# Patient Record
Sex: MALE | Race: WHITE | Employment: FULL TIME | URBAN - NONMETROPOLITAN AREA
[De-identification: names, ages, dates, MRNs, and addresses within clinical notes are randomized per-mention and may not be internally consistent; named-entity substitution may affect disease eponyms.]

---

## 2024-06-28 ENCOUNTER — TELEPHONE (OUTPATIENT)
Dept: FAMILY MEDICINE CLINIC | Age: 54
End: 2024-06-28

## 2024-06-28 ENCOUNTER — OFFICE VISIT (OUTPATIENT)
Dept: FAMILY MEDICINE CLINIC | Age: 54
End: 2024-06-28
Payer: COMMERCIAL

## 2024-06-28 VITALS
OXYGEN SATURATION: 98 % | DIASTOLIC BLOOD PRESSURE: 80 MMHG | SYSTOLIC BLOOD PRESSURE: 120 MMHG | HEART RATE: 98 BPM | WEIGHT: 183 LBS | TEMPERATURE: 100.5 F

## 2024-06-28 DIAGNOSIS — T14.8XXA MUSCLE STRAIN: Primary | ICD-10-CM

## 2024-06-28 DIAGNOSIS — M54.6 ACUTE RIGHT-SIDED THORACIC BACK PAIN: ICD-10-CM

## 2024-06-28 DIAGNOSIS — R19.01 RIGHT UPPER QUADRANT ABDOMINAL MASS: ICD-10-CM

## 2024-06-28 PROCEDURE — 4004F PT TOBACCO SCREEN RCVD TLK: CPT | Performed by: INTERNAL MEDICINE

## 2024-06-28 PROCEDURE — G8421 BMI NOT CALCULATED: HCPCS | Performed by: INTERNAL MEDICINE

## 2024-06-28 PROCEDURE — 99214 OFFICE O/P EST MOD 30 MIN: CPT | Performed by: INTERNAL MEDICINE

## 2024-06-28 PROCEDURE — 3017F COLORECTAL CA SCREEN DOC REV: CPT | Performed by: INTERNAL MEDICINE

## 2024-06-28 PROCEDURE — G8428 CUR MEDS NOT DOCUMENT: HCPCS | Performed by: INTERNAL MEDICINE

## 2024-06-28 RX ORDER — NAPROXEN 500 MG/1
500 TABLET ORAL 2 TIMES DAILY PRN
Qty: 20 TABLET | Refills: 0 | Status: SHIPPED
Start: 2024-06-28 | End: 2024-06-28

## 2024-06-28 RX ORDER — TIZANIDINE 4 MG/1
4 TABLET ORAL 3 TIMES DAILY PRN
Qty: 30 TABLET | Refills: 0 | Status: SHIPPED | OUTPATIENT
Start: 2024-06-28

## 2024-06-28 RX ORDER — NAPROXEN 500 MG/1
500 TABLET ORAL 2 TIMES DAILY PRN
Qty: 14 TABLET | Refills: 0 | Status: SHIPPED | OUTPATIENT
Start: 2024-06-28 | End: 2024-07-05

## 2024-06-28 RX ORDER — LISINOPRIL AND HYDROCHLOROTHIAZIDE 25; 20 MG/1; MG/1
1 TABLET ORAL DAILY
COMMUNITY
Start: 2024-06-08

## 2024-06-28 ASSESSMENT — ENCOUNTER SYMPTOMS
CONSTIPATION: 0
NAUSEA: 0
DIARRHEA: 0
ABDOMINAL PAIN: 1
VOMITING: 0
SHORTNESS OF BREATH: 0
BACK PAIN: 1

## 2024-06-28 NOTE — TELEPHONE ENCOUNTER
I personally spoke with patient regarding his thoracic back film and upper abdominal ultrasound.  No acute findings noted.  He did have some arthritic changes to the back and neck but no obvious hernia or abnormality to the right upper quadrant abdominal wall area.  I asked him to do what we talked about and expect a call from surgery next week for evaluation.  Follow-up with PCP.

## 2024-06-28 NOTE — PROGRESS NOTES
[facility]     24  Loy Méndez : 1970 Sex: male  Age: 54 y.o.    Chief Complaint   Patient presents with    Back Pain     2 weeks.  Noticed a \"lump\" on right side yesterday       HPI  Patient presents to express care today complaining of mid back pain for the past 2 weeks.  States he does a lot of heavy lifting at his job and lifted a heavy bag over his head feeling something pop in his back on the right side.  Has been bothering him since.  States the pain radiating around toward the side and front but now this morning noticed a lump to his right upper abdomen that is painful.  Denies any nausea vomiting or diarrhea.  No other abdominal pain.  States he is moving his bowels.  States when he is up walking moving around pain is not as bad as when he is sitting and lying.  Is having trouble sleeping because of this.  States he saw his chiropractor last week and had his back crack but did not really help.  No x-rays been done.  Denies any fever or chills although did have low-grade temperature of 100.5 here in the office today.          Review of Systems   Constitutional: Negative.  Negative for chills, fatigue and fever.   HENT: Negative.     Respiratory:  Negative for shortness of breath.    Cardiovascular:  Negative for chest pain, palpitations and leg swelling.   Gastrointestinal:  Positive for abdominal pain. Negative for constipation, diarrhea, nausea and vomiting.        See above   Endocrine: Negative.    Genitourinary:  Negative for dysuria, flank pain, frequency and hematuria.   Musculoskeletal:  Positive for back pain.   Skin:  Negative for rash.   Neurological:  Negative for dizziness, weakness, numbness and headaches.               REST OF PERTINENT ROS GONE OVER AND WAS NEGATIVE.                 Current Outpatient Medications:     lisinopril-hydroCHLOROthiazide (PRINZIDE;ZESTORETIC) 20-25 MG per tablet, Take 1 tablet by mouth daily, Disp: , Rfl:     tiZANidine (ZANAFLEX) 4 MG

## 2024-07-02 ENCOUNTER — OFFICE VISIT (OUTPATIENT)
Dept: SURGERY | Age: 54
End: 2024-07-02
Payer: COMMERCIAL

## 2024-07-02 VITALS
HEIGHT: 72 IN | OXYGEN SATURATION: 97 % | SYSTOLIC BLOOD PRESSURE: 122 MMHG | WEIGHT: 184 LBS | HEART RATE: 97 BPM | DIASTOLIC BLOOD PRESSURE: 85 MMHG | TEMPERATURE: 98 F | BODY MASS INDEX: 24.92 KG/M2

## 2024-07-02 DIAGNOSIS — R19.00 ABDOMINAL WALL BULGE: Primary | ICD-10-CM

## 2024-07-02 PROCEDURE — 99203 OFFICE O/P NEW LOW 30 MIN: CPT | Performed by: SURGERY

## 2024-07-02 RX ORDER — ATORVASTATIN CALCIUM 40 MG/1
TABLET, FILM COATED ORAL
COMMUNITY
Start: 2017-10-04

## 2024-07-02 NOTE — PROGRESS NOTES
status:        No family history on file.    Review of Systems   All other systems reviewed and are negative.              Objective:  Vitals:    07/02/24 1622   BP: 122/85   Pulse: 97   Temp: 98 °F (36.7 °C)   SpO2: 97%   Weight: 83.5 kg (184 lb)   Height: 1.829 m (6')        Body mass index is 24.95 kg/m².      Physical Exam  Constitutional:       General: He is not in acute distress.     Appearance: He is not diaphoretic.   HENT:      Head: Normocephalic and atraumatic.   Eyes:      General:         Right eye: No discharge.         Left eye: No discharge.   Neck:      Trachea: No tracheal deviation.   Cardiovascular:      Rate and Rhythm: Normal rate.   Pulmonary:      Effort: Pulmonary effort is normal. No respiratory distress.   Abdominal:      General: There is no distension.      Palpations: Abdomen is soft.      Tenderness: There is no guarding or rebound.       Skin:     General: Skin is warm and dry.   Neurological:      Mental Status: He is alert and oriented to person, place, and time.         CBC: No results found for: \"WBC\", \"RBC\", \"HGB\", \"HCT\", \"MCV\", \"MCH\", \"MCHC\", \"RDW\", \"PLT\", \"MPV\"  CMP:  No results found for: \"NA\", \"K\", \"CL\", \"CO2\", \"BUN\", \"CREATININE\", \"GFRAA\", \"AGRATIO\", \"LABGLOM\", \"GLUCOSE\", \"GLU\", \"LABALBU\", \"CALCIUM\", \"BILITOT\", \"ALKPHOS\", \"AST\", \"ALT\"  PT/INR:  No results found for: \"PROTIME\", \"INR\"  HgBA1c:  No results found for: \"LABA1C\"  LIPASE:  No results found for: \"LIPASE\"       Henri Copeland MD    I have examined the patient and performed the key aspects of physical exam, and reviewed the record (including laboratory findings, results, and all pertinent radiology images, which are independently reviewed and interpreted unless otherwise explicitly stated).  The referring provider's notes were also reviewed.    Any procedures planned or discussed as above had risks, benefits, and reasonable alternatives thoroughly discussed with the patient or responsible party.    NOTE: This

## 2024-07-22 ENCOUNTER — TELEPHONE (OUTPATIENT)
Dept: SURGERY | Age: 54
End: 2024-07-22

## 2024-07-22 DIAGNOSIS — R19.00 ABDOMINAL WALL BULGE: Primary | ICD-10-CM

## 2024-07-22 NOTE — TELEPHONE ENCOUNTER
Pt notified about CT with radiology on 7/25/24 at 430pm.   Patient has been informed of:  - prep if any  - date, time and location     - parking instructions    Given central scheduling's phone number if they need to cancel and reschedule.  Patient aware of labs prior to CT.    Scheduled follow up if needed.

## 2024-07-23 DIAGNOSIS — R19.00 ABDOMINAL WALL BULGE: ICD-10-CM

## 2024-07-23 LAB
BUN BLDV-MCNC: 15 MG/DL (ref 6–20)
CREAT SERPL-MCNC: 0.9 MG/DL (ref 0.7–1.2)
GFR, ESTIMATED: >90 ML/MIN/1.73M2

## 2024-07-25 ENCOUNTER — HOSPITAL ENCOUNTER (OUTPATIENT)
Dept: CT IMAGING | Age: 54
Discharge: HOME | End: 2024-07-27
Attending: SURGERY
Payer: COMMERCIAL

## 2024-07-25 DIAGNOSIS — R19.00 ABDOMINAL WALL BULGE: ICD-10-CM

## 2024-07-25 PROCEDURE — 74177 CT ABD & PELVIS W/CONTRAST: CPT

## 2024-07-25 PROCEDURE — 6360000004 HC RX CONTRAST MEDICATION: Performed by: RADIOLOGY

## 2024-07-25 RX ADMIN — IOPAMIDOL 75 ML: 755 INJECTION, SOLUTION INTRAVENOUS at 14:52

## 2024-07-31 ENCOUNTER — OFFICE VISIT (OUTPATIENT)
Dept: SURGERY | Age: 54
End: 2024-07-31
Payer: COMMERCIAL

## 2024-07-31 VITALS
HEIGHT: 72 IN | TEMPERATURE: 98.6 F | HEART RATE: 77 BPM | BODY MASS INDEX: 25.47 KG/M2 | SYSTOLIC BLOOD PRESSURE: 113 MMHG | WEIGHT: 188 LBS | DIASTOLIC BLOOD PRESSURE: 70 MMHG | OXYGEN SATURATION: 97 %

## 2024-07-31 DIAGNOSIS — R19.00 ABDOMINAL WALL BULGE: Primary | ICD-10-CM

## 2024-07-31 DIAGNOSIS — M62.58 ATROPHY OF MUSCLE OF OTHER SITE: ICD-10-CM

## 2024-07-31 PROCEDURE — G8419 CALC BMI OUT NRM PARAM NOF/U: HCPCS | Performed by: SURGERY

## 2024-07-31 PROCEDURE — 4004F PT TOBACCO SCREEN RCVD TLK: CPT | Performed by: SURGERY

## 2024-07-31 PROCEDURE — G8427 DOCREV CUR MEDS BY ELIG CLIN: HCPCS | Performed by: SURGERY

## 2024-07-31 PROCEDURE — 3017F COLORECTAL CA SCREEN DOC REV: CPT | Performed by: SURGERY

## 2024-07-31 PROCEDURE — 99213 OFFICE O/P EST LOW 20 MIN: CPT | Performed by: SURGERY

## 2024-08-05 NOTE — PROGRESS NOTES
[facility] Note    Assessment/Plan:     Diagnosis Orders   1. Abdominal wall bulge  MRI ABDOMEN W WO CONTRAST    CT negative for hernia. Likely just muscle atrophy. He like to evaluate further with MRI although does not appear to be any surgical option at this point.      2. Atrophy of muscle of other site  MRI ABDOMEN W WO CONTRAST            Chief Complaint   Patient presents with    Follow-up     Follow up CT        PCP: Lio Esquivel MD    HPI: Loy Méndez is a 54 y.o. male here for follow-up of right upper abdominal wall bulging.  He underwent CT.  Imaging was reviewed.  There is no obvious area of herniation.  Some of the muscle in the area does appear attenuated or atrophied but again not to a significant degree.  He is having significant pain in the area.    Review of Systems   All other systems reviewed and are negative.        No past medical history on file.    No past surgical history on file.    No family history on file.    Social History     Socioeconomic History    Marital status:      Spouse name: Not on file    Number of children: Not on file    Years of education: Not on file    Highest education level: Not on file   Occupational History    Not on file   Tobacco Use    Smoking status: Not on file    Smokeless tobacco: Not on file   Substance and Sexual Activity    Alcohol use: Not on file    Drug use: Not on file    Sexual activity: Not on file   Other Topics Concern    Not on file   Social History Narrative    Not on file     Social Determinants of Health     Financial Resource Strain: Not on file   Food Insecurity: Not on file   Transportation Needs: Not on file   Physical Activity: Not on file   Stress: Not on file   Social Connections: Not on file   Intimate Partner Violence: Not on file   Housing Stability: Not on file       Allergies   Allergen Reactions    Codeine Anaphylaxis         Current Outpatient Medications   Medication Sig Dispense Refill

## 2024-08-23 DIAGNOSIS — R19.00 ABDOMINAL WALL BULGE: Primary | ICD-10-CM

## 2024-08-26 ENCOUNTER — PATIENT MESSAGE (OUTPATIENT)
Dept: SURGERY | Age: 54
End: 2024-08-26

## 2024-09-05 DIAGNOSIS — R19.00 ABDOMINAL WALL BULGE: ICD-10-CM

## 2024-09-05 LAB
BUN BLDV-MCNC: 13 MG/DL (ref 6–20)
CREAT SERPL-MCNC: 0.8 MG/DL (ref 0.7–1.2)
GFR, ESTIMATED: >90 ML/MIN/1.73M2

## 2024-09-06 ENCOUNTER — HOSPITAL ENCOUNTER (OUTPATIENT)
Dept: MRI IMAGING | Age: 54
Discharge: HOME | End: 2024-09-08
Attending: SURGERY
Payer: COMMERCIAL

## 2024-09-06 ENCOUNTER — TELEPHONE (OUTPATIENT)
Dept: SURGERY | Age: 54
End: 2024-09-06

## 2024-09-06 ENCOUNTER — HOSPITAL ENCOUNTER (OUTPATIENT)
Dept: MRI IMAGING | Age: 54
Discharge: HOME | End: 2024-09-08
Payer: COMMERCIAL

## 2024-09-06 DIAGNOSIS — R19.00 ABDOMINAL WALL BULGE: ICD-10-CM

## 2024-09-06 DIAGNOSIS — M62.58 ATROPHY OF MUSCLE OF OTHER SITE: ICD-10-CM

## 2024-09-06 DIAGNOSIS — M54.6 PAIN IN THORACIC SPINE: ICD-10-CM

## 2024-09-06 PROCEDURE — 72146 MRI CHEST SPINE W/O DYE: CPT

## 2024-09-06 PROCEDURE — A9579 GAD-BASE MR CONTRAST NOS,1ML: HCPCS | Performed by: RADIOLOGY

## 2024-09-06 PROCEDURE — 6360000004 HC RX CONTRAST MEDICATION: Performed by: RADIOLOGY

## 2024-09-06 PROCEDURE — 74183 MRI ABD W/O CNTR FLWD CNTR: CPT

## 2024-09-06 RX ADMIN — GADOTERIDOL 17 ML: 279.3 INJECTION, SOLUTION INTRAVENOUS at 11:50

## 2024-09-06 NOTE — TELEPHONE ENCOUNTER
Spoke to patient.  MRI negative, no abnormalities of abdominal wall/hernias.  Follow up office as needed.  Recommend discussing further with pcp.  Patient verbalized understanding.

## 2024-09-06 NOTE — TELEPHONE ENCOUNTER
----- Message from Dr. Henri Copeland MD sent at 9/6/2024  1:43 PM EDT -----  Can let him know the MRI report is negative, no abnormalities of abdominal wall/hernias. F/u PRN. Recommend discuss further with his PCP.  ----- Message -----  From: Robb, [facility]hy Incoming Radiant Results From [facility]  Sent: 9/6/2024   1:34 PM EDT  To: Henri Copeland MD